# Patient Record
Sex: FEMALE | Race: WHITE | URBAN - METROPOLITAN AREA
[De-identification: names, ages, dates, MRNs, and addresses within clinical notes are randomized per-mention and may not be internally consistent; named-entity substitution may affect disease eponyms.]

---

## 2019-05-17 ENCOUNTER — Encounter (OUTPATIENT)
Dept: URBAN - METROPOLITAN AREA CLINIC 75 | Facility: CLINIC | Age: 74
End: 2019-05-17
Payer: MEDICARE

## 2019-05-17 PROCEDURE — 92004 COMPRE OPH EXAM NEW PT 1/>: CPT | Performed by: OPTOMETRIST

## 2019-05-17 PROCEDURE — 92014 COMPRE OPH EXAM EST PT 1/>: CPT | Performed by: OPTOMETRIST

## 2019-05-17 PROCEDURE — 92134 CPTRZ OPH DX IMG PST SGM RTA: CPT | Performed by: OPTOMETRIST

## 2020-01-28 ENCOUNTER — Encounter (OUTPATIENT)
Dept: URBAN - METROPOLITAN AREA CLINIC 75 | Facility: CLINIC | Age: 75
End: 2020-01-28
Payer: COMMERCIAL

## 2020-01-28 PROCEDURE — 92014 COMPRE OPH EXAM EST PT 1/>: CPT | Performed by: OPTOMETRIST

## 2020-03-09 ENCOUNTER — OFFICE VISIT (OUTPATIENT)
Dept: URBAN - METROPOLITAN AREA CLINIC 75 | Facility: CLINIC | Age: 75
End: 2020-03-09
Payer: MEDICARE

## 2020-03-09 DIAGNOSIS — H43.813 VITREOUS DEGENERATION, BILATERAL: ICD-10-CM

## 2020-03-09 DIAGNOSIS — H35.372 PUCKERING OF MACULA, LEFT EYE: Primary | ICD-10-CM

## 2020-03-09 PROCEDURE — 92014 COMPRE OPH EXAM EST PT 1/>: CPT | Performed by: OPTOMETRIST

## 2020-03-09 PROCEDURE — 92134 CPTRZ OPH DX IMG PST SGM RTA: CPT | Performed by: OPTOMETRIST

## 2020-03-09 PROCEDURE — 92133 CPTRZD OPH DX IMG PST SGM ON: CPT | Performed by: OPTOMETRIST

## 2020-03-09 ASSESSMENT — INTRAOCULAR PRESSURE
OS: 11
OD: 9

## 2020-03-09 NOTE — IMPRESSION/PLAN
Impression: Puckering of macula, left eye: H35.372. Plan: Discussed diagnosis in detail with patient. Will continue to observe condition and or symptoms.  OCT shows ERM is stable

## 2020-03-09 NOTE — IMPRESSION/PLAN
Impression: Open angle with borderline findings, low risk, bilateral: H40.013. Plan: Discussed diagnosis in detail with patient. Will continue to observe condition and or symptoms. Will continue to monitor IOP.   Due to ganglion cell thickness map, watch for nasal step OD

## 2020-03-09 NOTE — IMPRESSION/PLAN
Impression: Vitreous degeneration, bilateral: H43.813. Plan: Discussed diagnosis in detail with patient. Will continue to observe condition and or symptoms. Discussed signs and symptoms of PVD/floaters. Discussed signs and symptoms of retinal detachment.

## 2020-10-12 ENCOUNTER — OFFICE VISIT (OUTPATIENT)
Dept: URBAN - METROPOLITAN AREA CLINIC 75 | Facility: CLINIC | Age: 75
End: 2020-10-12
Payer: MEDICARE

## 2020-10-12 DIAGNOSIS — H04.123 DRY EYE SYNDROME OF BILATERAL LACRIMAL GLANDS: ICD-10-CM

## 2020-10-12 DIAGNOSIS — H35.443 AGE-RELATED RETICULAR DEGENERATION OF RETINA, BILATERAL: ICD-10-CM

## 2020-10-12 DIAGNOSIS — H40.013 OPEN ANGLE WITH BORDERLINE FINDINGS, LOW RISK, BILATERAL: Primary | ICD-10-CM

## 2020-10-12 PROCEDURE — 92014 COMPRE OPH EXAM EST PT 1/>: CPT | Performed by: OPTOMETRIST

## 2020-10-12 PROCEDURE — 92083 EXTENDED VISUAL FIELD XM: CPT | Performed by: OPTOMETRIST

## 2020-10-12 ASSESSMENT — INTRAOCULAR PRESSURE
OS: 14
OD: 14

## 2020-10-12 NOTE — IMPRESSION/PLAN
Impression: Dry eye syndrome of bilateral lacrimal glands: H04.123. Plan: Recommend pt use artificial tears or gels during the day to help improve dryness & vision.

## 2020-10-12 NOTE — IMPRESSION/PLAN
Impression: Open angle with borderline findings, low risk, bilateral: H40.013. Plan: Discussed diagnosis in detail with patient. Will continue to observe condition and or symptoms. Will continue to monitor IOP. Watch for nasal step OD & infer arcuate OS.

## 2021-01-28 ENCOUNTER — OFFICE VISIT (OUTPATIENT)
Dept: URBAN - METROPOLITAN AREA CLINIC 75 | Facility: CLINIC | Age: 76
End: 2021-01-28
Payer: COMMERCIAL

## 2021-01-28 PROCEDURE — 92014 COMPRE OPH EXAM EST PT 1/>: CPT | Performed by: OPTOMETRIST

## 2021-01-28 ASSESSMENT — KERATOMETRY
OS: 46.50
OD: 45.88

## 2021-01-28 ASSESSMENT — VISUAL ACUITY
OS: 20/40
OD: 20/20

## 2021-01-28 NOTE — IMPRESSION/PLAN
Impression: Puckering of macula, right eye: H35.371 - Re-reviewed 3/2020 OCT which is consistent w/ mild ERM OD. Discussed dx w/ pt. Plan: Observe.  Will update OCT at next exam

## 2021-03-18 ENCOUNTER — OFFICE VISIT (OUTPATIENT)
Dept: URBAN - METROPOLITAN AREA CLINIC 75 | Facility: CLINIC | Age: 76
End: 2021-03-18

## 2021-03-18 DIAGNOSIS — H52.4 PRESBYOPIA: Primary | ICD-10-CM

## 2021-03-18 DIAGNOSIS — H52.13 MYOPIA, BILATERAL: ICD-10-CM

## 2021-03-18 PROCEDURE — 92015 DETERMINE REFRACTIVE STATE: CPT | Performed by: OPTOMETRIST

## 2021-03-18 RX ORDER — PREDNISOLONE ACETATE 10 MG/ML
1 % SUSPENSION/ DROPS OPHTHALMIC
Qty: 5 | Refills: 1 | Status: ACTIVE
Start: 2021-03-18

## 2021-03-18 ASSESSMENT — INTRAOCULAR PRESSURE
OD: 15
OS: 17

## 2021-03-18 ASSESSMENT — VISUAL ACUITY
OD: 20/20
OS: 20/25

## 2021-07-22 ENCOUNTER — OFFICE VISIT (OUTPATIENT)
Dept: URBAN - METROPOLITAN AREA CLINIC 75 | Facility: CLINIC | Age: 76
End: 2021-07-22
Payer: MEDICARE

## 2021-07-22 DIAGNOSIS — H57.813 BROW PTOSIS, BILATERAL: ICD-10-CM

## 2021-07-22 DIAGNOSIS — H43.812 VITREOUS DEGENERATION, LEFT EYE: ICD-10-CM

## 2021-07-22 DIAGNOSIS — Z96.1 PRESENCE OF INTRAOCULAR LENS: ICD-10-CM

## 2021-07-22 DIAGNOSIS — H35.371 PUCKERING OF MACULA, RIGHT EYE: Primary | ICD-10-CM

## 2021-07-22 PROCEDURE — 99214 OFFICE O/P EST MOD 30 MIN: CPT | Performed by: OPTOMETRIST

## 2021-07-22 PROCEDURE — 92133 CPTRZD OPH DX IMG PST SGM ON: CPT | Performed by: OPTOMETRIST

## 2021-07-22 PROCEDURE — 92134 CPTRZ OPH DX IMG PST SGM RTA: CPT | Performed by: OPTOMETRIST

## 2021-07-22 ASSESSMENT — INTRAOCULAR PRESSURE
OS: 11
OD: 12

## 2021-07-22 NOTE — IMPRESSION/PLAN
Impression: Presence of intraocular lens: Z96.1. Cloudy capsule OS. Plan: Discussed. No treatment recommended at this time.

## 2021-07-22 NOTE — IMPRESSION/PLAN
Impression: Puckering of macula, right eye: H35.371. Plan: Discussed epiretinal membranes do not usually require treatment, unless distorted vision or blurry vision occur. The main treatment consists of vitrectomy surgery with peeling off of the epiretinal membrane. Epiretinal membrane formation is often without symptoms or effect on vision. They occur from aging, previous eye trauma or surgery, or chronic eye inflammation (such as uveitis). In rare instances, they can progress and have a significant affect on vision. Pt to contact office w/ any loss or distortion of vision.

## 2021-07-22 NOTE — IMPRESSION/PLAN
Impression: Vitreous degeneration, left eye: H43.812. Vitreous pull causing cloudy vision OS. Plan: Posterior vitreous detachments are a normal aging change due to the vitreous jelly pulling away from the retinal lining of the eye. They may accompany retinal tears, retinal holes, or retinal detachments, so a dilated retinal examination is important. PVDs will usually diminish with time, but it may take several months and they rarely disappear entirely. Pt to contact office if symptoms worsen, including an increase in number of floaters, you experience flashing lights, loss of vision, or a black curtain blocking your field of vision. Patient to consult with Dr. Toni Ramirez for possible treatment.

## 2021-07-22 NOTE — IMPRESSION/PLAN
Impression: Open angle with borderline findings, low risk, bilateral: H40.013. OCT performed 7/22/21 reveling floaters- wider and deeper than standard OU. Freckle & thinning, nasal step OD. Eval again with DE w/OCT Plan:

## 2021-10-01 ENCOUNTER — OFFICE VISIT (OUTPATIENT)
Dept: URBAN - METROPOLITAN AREA CLINIC 70 | Facility: CLINIC | Age: 76
End: 2021-10-01
Payer: MEDICARE

## 2021-10-01 PROCEDURE — 99204 OFFICE O/P NEW MOD 45 MIN: CPT | Performed by: OPHTHALMOLOGY

## 2021-10-01 PROCEDURE — 92134 CPTRZ OPH DX IMG PST SGM RTA: CPT | Performed by: OPHTHALMOLOGY

## 2021-10-01 RX ORDER — PREDNISOLONE ACETATE 10 MG/ML
1 % SUSPENSION/ DROPS OPHTHALMIC
Qty: 5 | Refills: 1 | Status: INACTIVE
Start: 2021-10-01 | End: 2022-01-18

## 2021-10-01 RX ORDER — OFLOXACIN 3 MG/ML
0.3 % SOLUTION/ DROPS OPHTHALMIC
Qty: 5 | Refills: 1 | Status: INACTIVE
Start: 2021-10-01 | End: 2021-10-07

## 2021-10-01 ASSESSMENT — INTRAOCULAR PRESSURE
OS: 15
OD: 14

## 2021-10-01 NOTE — IMPRESSION/PLAN
Impression: Vitreous Degeneration, OS. Plan: Exam and OCT reveal the retina is normal. The opacity's are blocking the vision. Recommend PPV with surgeon. Discussed risk 1/4000 of RD, or other vision damage. Schedule PPV [id number] with surgeon.

## 2021-11-03 ENCOUNTER — SURGERY (OUTPATIENT)
Dept: URBAN - METROPOLITAN AREA SURGERY 5 | Facility: SURGERY | Age: 76
End: 2021-11-03
Payer: MEDICARE

## 2021-11-03 PROCEDURE — 67036 REMOVAL OF INNER EYE FLUID: CPT | Performed by: OPHTHALMOLOGY

## 2021-11-04 ENCOUNTER — POST-OPERATIVE VISIT (OUTPATIENT)
Dept: URBAN - METROPOLITAN AREA CLINIC 71 | Facility: CLINIC | Age: 76
End: 2021-11-04
Payer: MEDICARE

## 2021-11-04 DIAGNOSIS — Z48.810 ENCOUNTER FOR SURGICAL AFTERCARE FOLLOWING SURGERY ON A SENSE ORGAN: Primary | ICD-10-CM

## 2021-11-04 PROCEDURE — 99024 POSTOP FOLLOW-UP VISIT: CPT | Performed by: OPHTHALMOLOGY

## 2021-11-04 ASSESSMENT — INTRAOCULAR PRESSURE
OD: 12
OS: 9

## 2021-11-12 ENCOUNTER — OFFICE VISIT (OUTPATIENT)
Dept: URBAN - METROPOLITAN AREA CLINIC 70 | Facility: CLINIC | Age: 76
End: 2021-11-12
Payer: MEDICARE

## 2021-11-12 DIAGNOSIS — H43.393 OTHER VITREOUS OPACITIES, BILATERAL: Primary | ICD-10-CM

## 2021-11-12 PROCEDURE — 99024 POSTOP FOLLOW-UP VISIT: CPT | Performed by: OPHTHALMOLOGY

## 2021-11-12 ASSESSMENT — INTRAOCULAR PRESSURE
OS: 16
OD: 15

## 2021-11-12 NOTE — IMPRESSION/PLAN
Impression: Other vitreous opacities, bilateral Plan: s/p PPV OS, doing well. cont drops as directed, monitor.  

RTC PRN retina

## 2021-12-20 ENCOUNTER — POST-OPERATIVE VISIT (OUTPATIENT)
Dept: URBAN - METROPOLITAN AREA CLINIC 71 | Facility: CLINIC | Age: 76
End: 2021-12-20
Payer: MEDICARE

## 2021-12-20 PROCEDURE — 99024 POSTOP FOLLOW-UP VISIT: CPT | Performed by: OPTOMETRIST

## 2021-12-20 ASSESSMENT — INTRAOCULAR PRESSURE
OS: 16
OD: 12

## 2021-12-20 NOTE — IMPRESSION/PLAN
Impression: S/P Posterior Vitrectomy (PPvit) OS - 47 Days. Encounter for surgical aftercare following surgery on a sense organ  Z48.810. Fluid filled sac vs herniated fat temporally. May or may not be related to surgery. Plan: Reassured pt. Discussed referral to Dr. Mello Saldivar for consultation if problematic. Pt elects to see Dr. Mello Saldivar.

## 2022-01-18 ENCOUNTER — POST-OPERATIVE VISIT (OUTPATIENT)
Dept: URBAN - METROPOLITAN AREA CLINIC 71 | Facility: CLINIC | Age: 77
End: 2022-01-18
Payer: MEDICARE

## 2022-01-18 DIAGNOSIS — H05.89 OTHER DISORDERS OF ORBIT: ICD-10-CM

## 2022-01-18 PROCEDURE — 99024 POSTOP FOLLOW-UP VISIT: CPT | Performed by: OPHTHALMOLOGY

## 2022-01-18 ASSESSMENT — INTRAOCULAR PRESSURE
OD: 11
OS: 11

## 2022-01-18 NOTE — IMPRESSION/PLAN
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 Left orbital cyst vs mass in lacrimal area Plan: Ordering MRI orbits (thin cuts) w/ & w/o contrast and renal function testing if needed.  Will contact patient with results

## 2022-01-18 NOTE — IMPRESSION/PLAN
Impression: S/P Posterior Vitrectomy (PPvit) OS - 76 Days. Encounter for surgical aftercare following surgery on a sense organ  Z48.810. Excellent post op course   Post operative instructions reviewed - Healing well Pt has possible lacrimal gland prolapse vs HOF. Recommending MRI of orbit to ensure no underlying causes Plan: Discussed. Will order MRI of orbit. Will contact patient with results. Contact office if any issues or new symptoms noticed.

## 2022-01-31 ENCOUNTER — OFFICE VISIT (OUTPATIENT)
Dept: URBAN - METROPOLITAN AREA CLINIC 75 | Facility: CLINIC | Age: 77
End: 2022-01-31
Payer: COMMERCIAL

## 2022-01-31 DIAGNOSIS — H02.824 CYSTS OF LEFT UPPER EYELID: ICD-10-CM

## 2022-01-31 PROCEDURE — 92014 COMPRE OPH EXAM EST PT 1/>: CPT | Performed by: OPTOMETRIST

## 2022-01-31 ASSESSMENT — INTRAOCULAR PRESSURE
OD: 11
OS: 10

## 2022-01-31 NOTE — IMPRESSION/PLAN
Impression: Myopia, bilateral: H52.13. Plan: New glasses Rx were distributed today. Patient to call office with any questions or concerns.

## 2022-01-31 NOTE — IMPRESSION/PLAN
Impression: Cysts of left upper eyelid: H02.824. Pt st MRI showing no issues. A chalazion (or meibomian cyst) is a small lump or swelling in the eyelid, containing fluid or semi-solid material. It is a fairly common condition and is caused by a blockage in one of the small glands in the eyelid, called the meibomian gland. Often, the blocked gland becomes inflamed or infected. Plan: Discussed. Pt to use Pred 4X OS for 10 days. If symptoms continue return for draining.

## 2022-02-24 ENCOUNTER — OFFICE VISIT (OUTPATIENT)
Dept: URBAN - METROPOLITAN AREA CLINIC 71 | Facility: CLINIC | Age: 77
End: 2022-02-24
Payer: MEDICARE

## 2022-02-24 DIAGNOSIS — H43.811 VITREOUS DEGENERATION, RIGHT EYE: ICD-10-CM

## 2022-02-24 DIAGNOSIS — H11.442 CONJUNCTIVAL CYST OF LT EYE: ICD-10-CM

## 2022-02-24 PROCEDURE — 68020 INCISE/DRAIN EYELID LINING: CPT | Performed by: OPHTHALMOLOGY

## 2022-02-24 RX ORDER — NEOMYCIN SULFATE, POLYMYXIN B SULFATE AND DEXAMETHASONE 3.5; 10000; 1 MG/ML; [USP'U]/ML; MG/ML
SUSPENSION/ DROPS OPHTHALMIC
Qty: 5 | Refills: 0 | Status: ACTIVE
Start: 2022-02-24

## 2022-02-24 ASSESSMENT — INTRAOCULAR PRESSURE
OD: 10
OS: 10

## 2022-02-24 NOTE — IMPRESSION/PLAN
Impression: Vitreous degeneration, right eye: H43.811. No holes or tears seen today. Plan: Patient to call if any signs or symptoms of a retinal detachment occur.

## 2022-02-24 NOTE — IMPRESSION/PLAN
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 Temporal bulbar conj. Discussed findings wit patient. Recommending drainage of the cyst. No recommendation for an excision at this time. Plan: Patient elects to proceed with drainage at this time. Instilled one drop of proparacaine in OS ad drained cyst in clinic. Patient tolerated procedure well. Will start patient on marjorie-poly-dex TID OS for 5 days. Patient to call if any pain or irritation occurs. Return to Dr. Olivares Resides in 3 months to recheck.

## 2022-05-24 ENCOUNTER — OFFICE VISIT (OUTPATIENT)
Dept: URBAN - METROPOLITAN AREA CLINIC 75 | Facility: CLINIC | Age: 77
End: 2022-05-24
Payer: MEDICARE

## 2022-05-24 DIAGNOSIS — H10.413 CHRONIC GIANT PAPILLARY CONJUNCTIVITIS, BILATERAL: ICD-10-CM

## 2022-05-24 DIAGNOSIS — H11.442 CONJUNCTIVAL CYST OF LT EYE: Primary | ICD-10-CM

## 2022-05-24 PROCEDURE — 99213 OFFICE O/P EST LOW 20 MIN: CPT | Performed by: OPTOMETRIST

## 2022-05-24 RX ORDER — NEOMYCIN SULFATE, POLYMYXIN B SULFATE AND DEXAMETHASONE 3.5; 10000; 1 MG/ML; [USP'U]/ML; MG/ML
SUSPENSION/ DROPS OPHTHALMIC
Qty: 5 | Refills: 0 | Status: ACTIVE
Start: 2022-05-24

## 2022-05-24 ASSESSMENT — INTRAOCULAR PRESSURE
OD: 12
OS: 10

## 2022-05-24 NOTE — IMPRESSION/PLAN
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 Plan: Giant papillary conjunctivitis (GPC) is an allergic reaction of the eye. It occurs when one or several small round bumps (papillae) develop on the underside of the eyelid. The underside of the eyelid is also called the upper tarsal conjunctiva. Recommend NEOPOLY DEX PRN

## 2022-05-24 NOTE — IMPRESSION/PLAN
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 Plan: Expressed. Contact the office if there's any pain or redness.

## 2022-05-24 NOTE — IMPRESSION/PLAN
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 Temporal bulbar conj. Discussed findings wit patient.  Plan:

## 2023-02-21 ENCOUNTER — OFFICE VISIT (OUTPATIENT)
Dept: URBAN - METROPOLITAN AREA CLINIC 75 | Facility: CLINIC | Age: 78
End: 2023-02-21
Payer: MEDICARE

## 2023-02-21 DIAGNOSIS — H40.013 OPEN ANGLE WITH BORDERLINE FINDINGS, LOW RISK, BILATERAL: Primary | ICD-10-CM

## 2023-02-21 DIAGNOSIS — H43.811 VITREOUS DEGENERATION, RIGHT EYE: ICD-10-CM

## 2023-02-21 PROCEDURE — 99213 OFFICE O/P EST LOW 20 MIN: CPT | Performed by: OPTOMETRIST

## 2023-02-21 PROCEDURE — 92133 CPTRZD OPH DX IMG PST SGM ON: CPT | Performed by: OPTOMETRIST

## 2023-02-21 RX ORDER — FLUOROMETHOLONE 1 MG/ML
0.1 % SOLUTION/ DROPS OPHTHALMIC
Qty: 5 | Refills: 1 | Status: INACTIVE
Start: 2023-02-21 | End: 2023-02-24

## 2023-02-21 RX ORDER — PREDNISOLONE ACETATE 10 MG/ML
1 % SUSPENSION/ DROPS OPHTHALMIC
Qty: 5 | Refills: 1 | Status: INACTIVE
Start: 2023-02-21 | End: 2023-02-24

## 2023-02-21 ASSESSMENT — INTRAOCULAR PRESSURE
OD: 8
OS: 8

## 2023-02-21 NOTE — IMPRESSION/PLAN
Impression: Vitreous degeneration, right eye: H43.811. No holes or tears seen today. Plan: Patient to call if any signs or symptoms of a retinal detachment occur. No

## 2023-02-21 NOTE — IMPRESSION/PLAN
Impression: Open angle with borderline findings, low risk, bilateral: H40.013. ON wider and deeper than standard OU Plan: Due to further OCT testing, The pt is no longer suspicious of glaucoma. Pt has normal IOP, healthy Optic nerves. RNFL is stable.

## 2023-04-04 ENCOUNTER — OFFICE VISIT (OUTPATIENT)
Dept: URBAN - METROPOLITAN AREA CLINIC 75 | Facility: CLINIC | Age: 78
End: 2023-04-04
Payer: COMMERCIAL

## 2023-04-04 DIAGNOSIS — H52.13 MYOPIA, BILATERAL: Primary | ICD-10-CM

## 2023-04-04 PROCEDURE — 92014 COMPRE OPH EXAM EST PT 1/>: CPT | Performed by: OPTOMETRIST

## 2023-04-04 ASSESSMENT — KERATOMETRY
OD: 45.50
OS: 46.75

## 2023-04-04 ASSESSMENT — INTRAOCULAR PRESSURE
OD: 10
OS: 13

## 2023-04-04 ASSESSMENT — VISUAL ACUITY
OD: 20/20
OS: 20/20

## 2024-02-06 ENCOUNTER — OFFICE VISIT (OUTPATIENT)
Dept: URBAN - METROPOLITAN AREA CLINIC 75 | Facility: CLINIC | Age: 79
End: 2024-02-06
Payer: COMMERCIAL

## 2024-02-06 DIAGNOSIS — B02.9 ZOSTER: ICD-10-CM

## 2024-02-06 DIAGNOSIS — H52.13 MYOPIA, BILATERAL: Primary | ICD-10-CM

## 2024-02-06 PROCEDURE — 92014 COMPRE OPH EXAM EST PT 1/>: CPT | Performed by: OPTOMETRIST

## 2024-02-06 RX ORDER — FLUOROMETHOLONE 1 MG/ML
0.1 % SOLUTION/ DROPS OPHTHALMIC
Qty: 5 | Refills: 1 | Status: INACTIVE
Start: 2024-02-06 | End: 2024-02-14

## 2024-02-06 ASSESSMENT — INTRAOCULAR PRESSURE
OS: 11
OD: 13

## 2024-02-06 ASSESSMENT — VISUAL ACUITY
OD: 20/20
OS: 20/20

## 2024-02-28 ENCOUNTER — OFFICE VISIT (OUTPATIENT)
Dept: URBAN - METROPOLITAN AREA CLINIC 75 | Facility: CLINIC | Age: 79
End: 2024-02-28

## 2024-02-28 DIAGNOSIS — H52.13 MYOPIA, BILATERAL: Primary | ICD-10-CM

## 2024-02-28 PROCEDURE — 92310 CONTACT LENS FITTING OU: CPT | Performed by: OPTOMETRIST

## 2024-02-28 ASSESSMENT — INTRAOCULAR PRESSURE
OD: 11
OS: 12

## 2024-02-28 ASSESSMENT — VISUAL ACUITY
OD: 20/20
OS: 20/20

## 2024-04-04 ENCOUNTER — OFFICE VISIT (OUTPATIENT)
Dept: URBAN - METROPOLITAN AREA CLINIC 75 | Facility: CLINIC | Age: 79
End: 2024-04-04
Payer: MEDICARE

## 2024-04-04 DIAGNOSIS — H43.811 VITREOUS DEGENERATION, RIGHT EYE: ICD-10-CM

## 2024-04-04 DIAGNOSIS — H10.45 OTHER CHRONIC ALLERGIC CONJUNCTIVITIS: Primary | ICD-10-CM

## 2024-04-04 PROCEDURE — 99213 OFFICE O/P EST LOW 20 MIN: CPT | Performed by: OPTOMETRIST

## 2024-04-04 ASSESSMENT — INTRAOCULAR PRESSURE
OS: 11
OD: 11

## 2025-07-07 ENCOUNTER — OFFICE VISIT (OUTPATIENT)
Dept: URBAN - METROPOLITAN AREA CLINIC 75 | Facility: CLINIC | Age: 80
End: 2025-07-07
Payer: MEDICARE

## 2025-07-07 DIAGNOSIS — H35.372 PUCKERING OF MACULA, LEFT EYE: ICD-10-CM

## 2025-07-07 DIAGNOSIS — B02.9 ZOSTER: ICD-10-CM

## 2025-07-07 DIAGNOSIS — H43.811 VITREOUS DEGENERATION, RIGHT EYE: Primary | ICD-10-CM

## 2025-07-07 PROCEDURE — 99213 OFFICE O/P EST LOW 20 MIN: CPT | Performed by: OPTOMETRIST

## 2025-07-07 RX ORDER — NEOMYCIN SULFATE, POLYMYXIN B SULFATE AND DEXAMETHASONE 3.5; 10000; 1 MG/ML; [USP'U]/ML; MG/ML
SUSPENSION/ DROPS OPHTHALMIC
Qty: 5 | Refills: 1 | Status: ACTIVE
Start: 2025-07-07

## 2025-07-07 RX ORDER — FLUOROMETHOLONE 1 MG/ML
0.1 % SOLUTION/ DROPS OPHTHALMIC
Qty: 5 | Refills: 1 | Status: ACTIVE
Start: 2025-07-07

## 2025-07-07 ASSESSMENT — INTRAOCULAR PRESSURE
OS: 16
OD: 12